# Patient Record
Sex: FEMALE | Race: WHITE | NOT HISPANIC OR LATINO | Employment: OTHER | URBAN - METROPOLITAN AREA
[De-identification: names, ages, dates, MRNs, and addresses within clinical notes are randomized per-mention and may not be internally consistent; named-entity substitution may affect disease eponyms.]

---

## 2024-05-24 ENCOUNTER — TRANSFERRED RECORDS (OUTPATIENT)
Dept: HEALTH INFORMATION MANAGEMENT | Facility: CLINIC | Age: 79
End: 2024-05-24
Payer: MEDICARE

## 2024-05-31 ENCOUNTER — MEDICAL CORRESPONDENCE (OUTPATIENT)
Dept: HEALTH INFORMATION MANAGEMENT | Facility: CLINIC | Age: 79
End: 2024-05-31
Payer: MEDICARE

## 2024-06-03 ENCOUNTER — TRANSCRIBE ORDERS (OUTPATIENT)
Dept: OTHER | Age: 79
End: 2024-06-03

## 2024-06-03 DIAGNOSIS — H47.10 PAPILLEDEMA: Primary | ICD-10-CM

## 2024-06-03 DIAGNOSIS — G93.2 IIH (IDIOPATHIC INTRACRANIAL HYPERTENSION): ICD-10-CM

## 2024-06-03 DIAGNOSIS — G44.52 NEW DAILY PERSISTENT HEADACHE: ICD-10-CM

## 2024-06-05 ENCOUNTER — TELEPHONE (OUTPATIENT)
Dept: OPHTHALMOLOGY | Facility: CLINIC | Age: 79
End: 2024-06-05
Payer: MEDICARE

## 2024-06-05 NOTE — TELEPHONE ENCOUNTER
Called and LVM     Ok to schedule with dr. Reyna our neuro optom for next available     Diane Vazquez Communication Facilitator on 6/5/2024 at 9:34 AM

## 2024-06-05 NOTE — TELEPHONE ENCOUNTER
Reviewed with neuro-ophthalmology and ok for Dr. Reyna in Neuro-optometry.    Note to patient communicator to assist in scheduling.    Boom Bautista RN 9:09 AM 06/05/24

## 2024-06-05 NOTE — TELEPHONE ENCOUNTER
Per review of referral ok for next available with Neuro-Ophthalmology.    Note to patient communicator to assist in scheduling and neuro-ophthalmology team to assist in review of referral also/amendment to scheduling if applicable.    Boom Bautista RN 8:45 AM 06/05/24

## 2024-06-05 NOTE — TELEPHONE ENCOUNTER
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    Please reach out to pt with scheduling options.    Thank You!    Action Taken: Message routed to:  [facility] eye    Travel Screening: Not Applicable     Date of Service:

## 2024-06-11 NOTE — PROGRESS NOTES
Janet Tee is a 78 year old female with the following diagnoses:   1. Anomalous optic nerve (H)    2. IIH (idiopathic intracranial hypertension)         Patient was sent for consultation by Dr. Villarreal for papilledema rule-out.    HPI:    Hunter is currently being monitored for suspect IIH at [facility] following new onset, severe headaches as well as possible papilledema (per chart review initially suspected to be malignant HTN in the setting of high BP and retinopathy during eye exam on 4/19/24).  MRI on 5/16/24 with findings suggestive of IIH, per referring note, consideration of LP.    Hunter reports that she had cataract surgery in both eyes March 2024.  Since April, she reports new, severe headaches.  She has been taking tylenol every 4 hours with minimal improvement.  Severity waxes and wanes, but overall this has affected her quality of life.    She has not noticed marked vision changes or eye pain.  She is typically monitored at [facility] (notes below).    She denies pulsatile tinnitus, TVOs, or double vision.     Patient is not currently on acetazolamide.    Independent historians:  Patient + daughter     Review of outside testing:  MRI Brain and Orbits WO Contrast + MRV 5/16/24:  Impression:   1. Partially empty morphology of the sella. Minimal prominence of subarachnoid spaces in the anterior aspects of the optic nerve sheaths. Questionable protrusion of the left papilla and slight flattening of the posterior sclera of the right side. These findings raise the possibility of idiopathic intracranial hypertension in the appropriate clinical setting.   2. No evidence of acute intracranial abnormality.   3. Mild parenchymal volume loss. Moderate chronic microangiopathic white matter changes.   4. No pathologic enhancement the brain parenchyma.     Impression:   1. No evidence of dural venous sinus thrombosis.   2. 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 Severe stenosis of the junction of the dominant left transverse and sigmoid sinuses. Findings can be seen in association with idiopathic intracranial hypertension in the appropriate clinical setting.       Review of outside clinical notes:  Visit with Dr. Villarreal ([place]) 5/24/24:          Visit with Dr. Sheridan Girard 4/19/24:      Past medical history:  Sensorineural hearing loss, asymmetrical  SHARA  Colon Polyps  IBS  Metabolic Syndrome  Hyperlipidemia  HTN  Paroxysmal SVT  CKD     Medications:   Simvastatin  Lisinopril  Metoprolol  Levofloxacin  NTG  Aspirin  Desvenlafaxine  Furosemide      Exam:  Corrected distance visual acuity was 20/25 +1 in the right eye and 20/25 -2 in the left eye. Intraocular pressure was 10 in the right eye and 09 in the left eye using ICare.  Pupils isocoric with no APD.    See complete chart note for anterior segment, fundus, and strabismus exam.    Tests ordered and interpreted today:  OCT RNFL:  Right eye: Average RNFL 97 microns.  Normal compared to age-matched controls.  Left eye: Average RNFL 105 microns.  Normal compared to age-matched controls.    GTop:  Right eye: Unreliable due to high FP.  Mean deviation -0.5 dB.  Superior arcuate.  Left eye: Unreliable due to high FP.  Mean deviation -0.6 dB.  Central scotoma.    Assessment/Plan:   Today, Hunter has bilateral, blurring of optic disc margins in both eyes.  I agree with Dr. Sheridan Girard ([facility]) that papilledema is questionable.  In the setting of MRI findings suggestive of IIH, this will need to be closely monitored.  I will have her follow-up with me in 6 weeks for repeat OCT to monitor her optic nerve; if this is stable, she may have physiologic, anomalous optic nerve appearance rather than true papilledema.  Her visual field testing today was unreliable due to excessive false positives.    Beyond headaches, she does not have pulsatile tinnitus, TVOs, or double vision.    Hunter's  primary questions today regarded headache management and the possibility of a pursuing lumbar puncture.  I will defer to her neurologist, Dr. Villarreal, at [facility], who will receive a copy of today's exam note.    Complete documentation of historical and exam elements from today's encounter can be found in the full encounter summary report (not reduplicated in this progress note). I personally obtained the chief complaint(s) and history of present illness. I confirmed and edited as necessary the review of systems, past medical/surgical history, family history, social history, and examination findings as document by others; and I examined the patient myself. I personally reviewed the relevant tests, images, and reports as documented above. I formulated and edited as necessary the assessment and plan and discussed the findings and management plan with the patient and family.    Coretta Reyna OD

## 2024-06-17 ENCOUNTER — OFFICE VISIT (OUTPATIENT)
Dept: OPHTHALMOLOGY | Facility: CLINIC | Age: 79
End: 2024-06-17
Payer: MEDICARE

## 2024-06-17 DIAGNOSIS — G93.2 IIH (IDIOPATHIC INTRACRANIAL HYPERTENSION): ICD-10-CM

## 2024-06-17 DIAGNOSIS — Q07.8 ANOMALOUS OPTIC NERVE (H): Primary | ICD-10-CM

## 2024-06-17 PROBLEM — H47.11 PAPILLEDEMA ASSOCIATED WITH INCREASED INTRACRANIAL PRESSURE: Status: ACTIVE | Noted: 2024-04-23

## 2024-06-17 PROBLEM — M85.89 OSTEOPENIA OF MULTIPLE SITES: Status: ACTIVE | Noted: 2024-04-23

## 2024-06-17 PROBLEM — H02.831 DERMATOCHALASIS OF BOTH UPPER EYELIDS: Status: ACTIVE | Noted: 2024-06-17

## 2024-06-17 PROBLEM — F33.9 DEPRESSION, RECURRENT (H): Status: ACTIVE | Noted: 2023-02-06

## 2024-06-17 PROBLEM — G31.84 MILD COGNITIVE IMPAIRMENT: Status: ACTIVE | Noted: 2018-08-23

## 2024-06-17 PROBLEM — H02.834 DERMATOCHALASIS OF BOTH UPPER EYELIDS: Status: ACTIVE | Noted: 2024-06-17

## 2024-06-17 PROBLEM — N18.30 CKD (CHRONIC KIDNEY DISEASE) STAGE 3, GFR 30-59 ML/MIN (H): Status: ACTIVE | Noted: 2019-09-12

## 2024-06-17 PROBLEM — Z86.79 HISTORY OF CARDIOMYOPATHY: Status: ACTIVE | Noted: 2024-06-17

## 2024-06-17 PROBLEM — F43.21 ADJUSTMENT DISORDER WITH DEPRESSED MOOD: Status: ACTIVE | Noted: 2024-06-17

## 2024-06-17 PROBLEM — E78.2 MIXED HYPERLIPIDEMIA: Status: ACTIVE | Noted: 2024-06-17

## 2024-06-17 PROBLEM — E55.9 VITAMIN D DEFICIENCY: Status: ACTIVE | Noted: 2024-06-17

## 2024-06-17 PROBLEM — E88.810 METABOLIC SYNDROME: Status: ACTIVE | Noted: 2024-06-17

## 2024-06-17 PROBLEM — N39.0 RECURRENT URINARY TRACT INFECTION: Status: ACTIVE | Noted: 2024-02-05

## 2024-06-17 PROBLEM — K58.9 IRRITABLE BOWEL SYNDROME: Status: ACTIVE | Noted: 2024-06-17

## 2024-06-17 PROBLEM — H47.10 OPTIC NERVE EDEMA: Status: ACTIVE | Noted: 2024-04-23

## 2024-06-17 PROBLEM — I47.10 PAROXYSMAL SVT (SUPRAVENTRICULAR TACHYCARDIA) (H): Status: ACTIVE | Noted: 2023-03-06

## 2024-06-17 PROBLEM — G47.33 OSA (OBSTRUCTIVE SLEEP APNEA): Status: ACTIVE | Noted: 2024-06-17

## 2024-06-17 PROBLEM — K63.5 COLON POLYPS: Status: ACTIVE | Noted: 2024-06-17

## 2024-06-17 PROBLEM — I10 ESSENTIAL HYPERTENSION: Status: ACTIVE | Noted: 2019-07-23

## 2024-06-17 PROCEDURE — 99204 OFFICE O/P NEW MOD 45 MIN: CPT

## 2024-06-17 PROCEDURE — 92083 EXTENDED VISUAL FIELD XM: CPT

## 2024-06-17 PROCEDURE — G0463 HOSPITAL OUTPT CLINIC VISIT: HCPCS

## 2024-06-17 PROCEDURE — 92133 CPTRZD OPH DX IMG PST SGM ON: CPT

## 2024-06-17 RX ORDER — ROSUVASTATIN CALCIUM 40 MG/1
1 TABLET, COATED ORAL AT BEDTIME
COMMUNITY

## 2024-06-17 RX ORDER — IBUPROFEN 800 MG/1
400 TABLET, FILM COATED ORAL EVERY 4 HOURS PRN
COMMUNITY

## 2024-06-17 RX ORDER — SIMVASTATIN 40 MG
1 TABLET ORAL AT BEDTIME
COMMUNITY
Start: 2023-05-19

## 2024-06-17 RX ORDER — LEVOFLOXACIN 500 MG/1
1 TABLET, FILM COATED ORAL DAILY
COMMUNITY

## 2024-06-17 RX ORDER — LISINOPRIL 10 MG/1
1 TABLET ORAL DAILY
COMMUNITY

## 2024-06-17 RX ORDER — ASPIRIN 81 MG/1
1 TABLET ORAL DAILY
COMMUNITY

## 2024-06-17 RX ORDER — NITROGLYCERIN 0.4 MG/1
0.4 TABLET SUBLINGUAL EVERY 5 MIN PRN
COMMUNITY
Start: 2023-02-06

## 2024-06-17 RX ORDER — DESVENLAFAXINE 25 MG/1
25 TABLET, EXTENDED RELEASE ORAL DAILY
COMMUNITY

## 2024-06-17 RX ORDER — METOPROLOL SUCCINATE 50 MG/1
1 TABLET, EXTENDED RELEASE ORAL DAILY
COMMUNITY
Start: 2023-05-09

## 2024-06-17 RX ORDER — FUROSEMIDE 20 MG
20 TABLET ORAL DAILY
COMMUNITY

## 2024-06-17 ASSESSMENT — REFRACTION_WEARINGRX
OD_CYLINDER: +1.50
SPECS_TYPE: PAL
OS_ADD: +2.75
OD_AXIS: 060
OS_AXIS: 130
OD_ADD: +2.75
OS_CYLINDER: +1.75
OD_SPHERE: -0.25
OS_SPHERE: -0.50

## 2024-06-17 ASSESSMENT — CONF VISUAL FIELD
OD_INFERIOR_NASAL_RESTRICTION: 0
OD_NORMAL: 1
OS_INFERIOR_TEMPORAL_RESTRICTION: 0
OS_INFERIOR_NASAL_RESTRICTION: 0
OD_SUPERIOR_TEMPORAL_RESTRICTION: 0
OS_SUPERIOR_NASAL_RESTRICTION: 0
OD_INFERIOR_TEMPORAL_RESTRICTION: 0
OS_SUPERIOR_TEMPORAL_RESTRICTION: 0
OS_NORMAL: 1
OD_SUPERIOR_NASAL_RESTRICTION: 0

## 2024-06-17 ASSESSMENT — TONOMETRY
IOP_METHOD: ICARE
OS_IOP_MMHG: 09
OD_IOP_MMHG: 10

## 2024-06-17 ASSESSMENT — CUP TO DISC RATIO
OD_RATIO: 0.1
OS_RATIO: 0.1

## 2024-06-17 ASSESSMENT — SLIT LAMP EXAM - LIDS
COMMENTS: DERMATOCHALASIS
COMMENTS: DERMATOCHALASIS

## 2024-06-17 ASSESSMENT — VISUAL ACUITY
CORRECTION_TYPE: GLASSES
OS_CC+: -2
METHOD: SNELLEN - LINEAR
OD_CC: 20/25
OD_CC+: +1
OS_CC: 20/25

## 2024-06-17 ASSESSMENT — EXTERNAL EXAM - LEFT EYE: OS_EXAM: NORMAL

## 2024-06-17 ASSESSMENT — EXTERNAL EXAM - RIGHT EYE: OD_EXAM: NORMAL

## 2024-06-17 NOTE — NURSING NOTE
Chief Complaints and History of Present Illnesses   Patient presents with    Papilledema Evaluation     Chief Complaint(s) and History of Present Illness(es)       Papilledema Evaluation              Laterality: both eyes              Comments    Pt. States that she had CE/IOL BE in March 2024. Had new glasses made in April and has been getting severe daily headaches since then. Has been taking tylenol every 4 hours with minimal improvement. Pain radiated throughout entire head. No eye pain BE. Still having a constant headache but severity has lessened. No change in VA BE.    Li Chong COT 8:54 AM June 17, 2024

## 2024-08-20 NOTE — PROGRESS NOTES
HPI:    Patient was last seen on 6/17/24 by me for papilledema vs. pseudopapilledema in the setting of  suspect IIH  (followed at [facility] due to severe headaches as well as possible papilledema-- initially suspected to be malignant HTN in the setting of high BP and retinopathy during eye exam on 4/19/24).  At that time, her exam was borderline.  She denied pulsatile tinnitus, TVOs, or double vision.    Since then, she reports that she has started receiving occipital injections at [facility] for her headaches every 3 weeks.  These have helped with the severity of her headaches.  Subsequently, she had a LP with OP <15 cm H20.    She denies pulsatile tinnitus, TVOs, or double vision.   She does feel like her distance vision is blurrier than before.  She is due for updated refraction in November with Dr. Palumbo.    Review of outside testing:  Opening pressure 7/10/24: <15 cm H20    Today's Testing:  OCT RNFL:  Right eye: Average RNFL 97 microns (previously 97).  Normal compared to age-matched controls.  Left eye: Average RNFL 106 microns (previously 105).  Normal compared to age-matched controls.     GTop:  Right eye: Reliable.  Mean deviation -6.8 dB.  Non-specific defects.  Left eye: Reliable.  Mean deviation -2.0 dB.  Non-specific defects.    Assessment and Plan:  Today,  has a stable structural eye exam WITHOUT evidence of papilledema.  Her retinal nerve fiber layer is unchanged.  In addition, she has now had a lumbar puncture with normal opening pressure.  Her visual field testing has been variable (low reliability previously), but I reassured her of an otherwise stable exam.  At this time, I recommended she continue her headache management at [facility], and her eye care with Dr. Palumbo.  I am happy to see her in the future with any concerns/changes, but I did not schedule a follow-up today.    Complete documentation of historical and exam elements from today's encounter can be found in the full encounter summary  report (not reduplicated in this progress note). I personally obtained the chief complaint(s) and history of present illness. I confirmed and edited as necessary the review of systems, past medical/surgical history, family history, social history, and examination findings as document by others; and I examined the patient myself. I personally reviewed the relevant tests, images, and reports as documented above. I formulated and edited as necessary the assessment and plan and discussed the findings and management plan with the patient and family.    Coretta Reyna, OD

## 2024-09-03 ENCOUNTER — OFFICE VISIT (OUTPATIENT)
Dept: OPHTHALMOLOGY | Facility: CLINIC | Age: 79
End: 2024-09-03
Payer: MEDICARE

## 2024-09-03 DIAGNOSIS — Q07.8 ANOMALOUS OPTIC NERVE (H): Primary | ICD-10-CM

## 2024-09-03 DIAGNOSIS — H53.453 ABNORMAL PERIPHERAL VISION OF BOTH EYES: ICD-10-CM

## 2024-09-03 PROCEDURE — 92083 EXTENDED VISUAL FIELD XM: CPT

## 2024-09-03 PROCEDURE — G0463 HOSPITAL OUTPT CLINIC VISIT: HCPCS

## 2024-09-03 PROCEDURE — 92133 CPTRZD OPH DX IMG PST SGM ON: CPT

## 2024-09-03 PROCEDURE — 99213 OFFICE O/P EST LOW 20 MIN: CPT

## 2024-09-03 ASSESSMENT — REFRACTION_WEARINGRX
OD_ADD: +2.75
OS_CYLINDER: +1.75
OD_AXIS: 060
OD_CYLINDER: +1.50
SPECS_TYPE: LINED TRIFOCAL
OD_SPHERE: -0.25
OS_ADD: +2.75
OS_AXIS: 130
OS_SPHERE: -0.50

## 2024-09-03 ASSESSMENT — CONF VISUAL FIELD
OS_SUPERIOR_NASAL_RESTRICTION: 0
OS_INFERIOR_NASAL_RESTRICTION: 0
OD_SUPERIOR_TEMPORAL_RESTRICTION: 0
OS_NORMAL: 1
OD_NORMAL: 1
OD_SUPERIOR_NASAL_RESTRICTION: 0
OD_INFERIOR_TEMPORAL_RESTRICTION: 0
METHOD: COUNTING FINGERS
OD_INFERIOR_NASAL_RESTRICTION: 0
OS_SUPERIOR_TEMPORAL_RESTRICTION: 0
OS_INFERIOR_TEMPORAL_RESTRICTION: 0

## 2024-09-03 ASSESSMENT — VISUAL ACUITY
OD_CC: 20/20
OS_CC: 20/25
METHOD: SNELLEN - LINEAR
OD_CC+: -3
OS_CC+: -3
CORRECTION_TYPE: GLASSES

## 2024-09-03 ASSESSMENT — SLIT LAMP EXAM - LIDS
COMMENTS: DERMATOCHALASIS
COMMENTS: DERMATOCHALASIS

## 2024-09-03 ASSESSMENT — EXTERNAL EXAM - LEFT EYE: OS_EXAM: NORMAL

## 2024-09-03 ASSESSMENT — TONOMETRY
IOP_METHOD: ICARE
OD_IOP_MMHG: 10
OS_IOP_MMHG: 10

## 2024-09-03 ASSESSMENT — CUP TO DISC RATIO
OS_RATIO: 0.1
OD_RATIO: 0.1

## 2024-09-03 ASSESSMENT — EXTERNAL EXAM - RIGHT EYE: OD_EXAM: NORMAL

## 2025-06-08 ENCOUNTER — HEALTH MAINTENANCE LETTER (OUTPATIENT)
Age: 80
End: 2025-06-08